# Patient Record
Sex: FEMALE | Race: WHITE | URBAN - METROPOLITAN AREA
[De-identification: names, ages, dates, MRNs, and addresses within clinical notes are randomized per-mention and may not be internally consistent; named-entity substitution may affect disease eponyms.]

---

## 2020-07-22 VITALS — WEIGHT: 135 LBS | BODY MASS INDEX: 20.46 KG/M2 | HEIGHT: 68 IN

## 2020-07-22 RX ORDER — SENNOSIDES 25 MG/1
TABLET, FILM COATED ORAL
COMMUNITY

## 2020-07-22 RX ORDER — ZOLPIDEM TARTRATE 5 MG/1
TABLET ORAL
COMMUNITY

## 2020-07-22 RX ORDER — TRIAMCINOLONE ACETONIDE 40 MG/ML
INJECTION, SUSPENSION INTRA-ARTICULAR; INTRAMUSCULAR ONCE
COMMUNITY

## 2020-07-22 RX ORDER — TRAMADOL HYDROCHLORIDE 50 MG/1
50 TABLET ORAL
COMMUNITY

## 2020-07-22 RX ORDER — DICLOFENAC SODIUM 30 MG/G
GEL TOPICAL 2 TIMES DAILY
COMMUNITY

## 2020-07-22 RX ORDER — DEXTROAMPHETAMINE SACCHARATE, AMPHETAMINE ASPARTATE, DEXTROAMPHETAMINE SULFATE AND AMPHETAMINE SULFATE 7.5; 7.5; 7.5; 7.5 MG/1; MG/1; MG/1; MG/1
30 TABLET ORAL
COMMUNITY

## 2020-07-22 RX ORDER — LIDOCAINE HYDROCHLORIDE 10 MG/ML
INJECTION, SOLUTION EPIDURAL; INFILTRATION; INTRACAUDAL; PERINEURAL ONCE
COMMUNITY

## 2020-07-22 RX ORDER — DEXTROAMPHETAMINE SACCHARATE, AMPHETAMINE ASPARTATE, DEXTROAMPHETAMINE SULFATE AND AMPHETAMINE SULFATE 3.75; 3.75; 3.75; 3.75 MG/1; MG/1; MG/1; MG/1
15 TABLET ORAL
COMMUNITY

## 2020-07-31 ENCOUNTER — OFFICE VISIT (OUTPATIENT)
Dept: ORTHOPEDIC SURGERY | Age: 60
End: 2020-07-31
Payer: MEDICAID

## 2020-07-31 DIAGNOSIS — M25.532 LEFT WRIST PAIN: Primary | ICD-10-CM

## 2020-07-31 PROCEDURE — 99213 OFFICE O/P EST LOW 20 MIN: CPT | Performed by: ORTHOPAEDIC SURGERY

## 2020-07-31 NOTE — PATIENT INSTRUCTIONS
Arm Pain: Care Instructions  Your Care Instructions     You can hurt your arm by using it too much or by injuring it. Biking, wrestling, and home repair projects are examples of activities that can lead to arm pain. Everyday wear and tear, especially as you get older, can cause arm pain. Your forearms, wrists, hands, and fingers are the parts of your arm that are most likely to become painful. A minor arm injury usually will heal on its own with home treatment to relieve swelling and pain. If you have a more serious injury, you may need tests and treatment. Follow-up care is a key part of your treatment and safety. Be sure to make and go to all appointments, and call your doctor if you are having problems. It's also a good idea to know your test results and keep a list of the medicines you take. How can you care for yourself at home? · Take pain medicines exactly as directed. ? If the doctor gave you a prescription medicine for pain, take it as prescribed. ? If you are not taking a prescription pain medicine, ask your doctor if you can take an over-the-counter medicine. · Rest and protect your arm. Take a break from any activity that may cause pain. · Put ice or a cold pack on your arm for 10 to 20 minutes at a time. Put a thin cloth between the ice and your skin. · Prop up the sore arm on a pillow when you ice it or anytime you sit or lie down during the next 3 days. Try to keep it above the level of your heart. This will help reduce swelling. · If your doctor recommends a sling to support your arm, wear it as directed. When should you call for help? [id number] anytime you think you may need emergency care. For example, call if:  · Your arm or hand is cool or pale or changes color. Call your doctor now or seek immediate medical care if:  · You cannot use your arm. · You have signs of infection, such as:  ? Increased pain, swelling, warmth, or redness.   ? Red streaks running up or down your arm.  ? Pus draining from an area of your arm. ? A fever. · You have tingling, weakness, or numbness in your arm. Watch closely for changes in your health, and be sure to contact your doctor if:  · You do not get better as expected. Where can you learn more? Go to http://krystina-meghann.info/  Enter B641 in the search box to learn more about \"Arm Pain: Care Instructions. \"  Current as of: June 26, 2019               Content Version: 12.5  © 3378-7287 [employer]. Care instructions adapted under license by [employer] (which disclaims liability or warranty for this information). If you have questions about a medical condition or this instruction, always ask your healthcare professional. [address] any warranty or liability for your use of this information.

## 2020-07-31 NOTE — PROGRESS NOTES
Providers protocol for the intake nurse to complete in patient's chart:    Nancy Stephens presents today for   Chief Complaint   Patient presents with    Wrist Pain     left       [facility]  [facility] by [facility]    Provider will complete the rest of patient's chart

## 2020-07-31 NOTE — PROGRESS NOTES
Name: Darryle Officer    : 1960  Service Dept: [address] [place] [address] MEDICINE         Chief Complaint   Patient presents with    Wrist Pain     left        Patient's Pharmacies:    [id number] - [address]  [address] [place] [address]  Phone: [phone] Fax: [phone]       There were no vitals taken for this visit. Allergies   Allergen Reactions    Doxycycline Swelling        Current Outpatient Medications   Medication Sig Dispense Refill    dextroamphetamine-amphetamine (ADDERALL) 15 mg tablet Take 15 mg by mouth.  dextroamphetamine-amphetamine (ADDERALL) 30 mg tablet Take 30 mg by mouth.  diclofenac (SOLARAZE) 3 % topical gel Apply  to affected area two (2) times a day.  influenza vaccine -, 6 mos+,,PF, (Fluarix Quad [id number], PF,) syrg injection 0.5 mL by IntraMUSCular route PRIOR TO DISCHARGE.  triamcinolone acetonide (Kenalog) 40 mg/mL injection once.  lidocaine, PF, (XYLOCAINE) 10 mg/mL (1 %) injection by IntraVENous route once.  lidocaine 5 % topical cream Apply  to affected area two (2) times daily as needed.  traMADoL (ULTRAM) 50 mg tablet Take 50 mg by mouth every six (6) hours as needed for Pain.  zolpidem (AMBIEN) 5 mg tablet Take  by mouth nightly as needed for Sleep. There is no problem list on file for this patient.        Family History   Problem Relation Age of Onset    Cancer Mother     Arthritis-rheumatoid Mother     Cancer Brother     Alcohol abuse Brother         Social History     Socioeconomic History    Marital status:      Spouse name: Not on file    Number of children: Not on file    Years of education: Not on file    Highest education level: Not on file   Tobacco Use    Smoking status: Never Smoker    Smokeless tobacco: Never Used   Substance and Sexual Activity    Alcohol use: Yes     Comment: wine once a month        Past Surgical History:   Procedure Laterality Date    HX  SECTION      HX HEMORRHOIDECTOMY          Past Medical History:   Diagnosis Date    Heart disease         HPI:   I have reviewed and agree with PFSH and ROS and intake form in chart and the record. Review of Systems:   Patient is a pleasant appearing individual, appropriately dressed, well hydrated, well nourished, who is alert, appropriately oriented for age, and in no acute distress with a normal gait and normal affect who does not appear to be in any significant pain. Physical Exam:  Left wrist - grossly neurovascularly intact, good cap refill, positive tenderness to palpation, positive soft tissue swelling, decreased range of motion, decreased strength, skin intact. Right wrist- Grossly neurovascularly intact, good cap refill, full range of motion, no weakness, no swelling, no point tenderness, no skin lesions. HPI:  The patient is here with a chief complaint of left wrist pain, dull pain. It is the same. Ice has helped. Using it makes it worse. Pain is 2/10. ROS:  Unremarkable. X-rays of the left wrist are unremarkable. She continues to have hemorrhagic blistering at the left wrist.     Assessment/Plan:  Plan at this point, my recommendation would be for an MRI to assess that and go from there. if the patient gets worse, they will give me a call. No restrictions in the meantime. Again, left wrist x-rays are unremarkable. Return to Office:    Follow-up Information    None

## 2020-08-06 DIAGNOSIS — M25.532 LEFT WRIST PAIN: ICD-10-CM

## 2020-08-26 ENCOUNTER — VIRTUAL VISIT (OUTPATIENT)
Dept: ORTHOPEDIC SURGERY | Age: 60
End: 2020-08-26
Payer: MEDICAID

## 2020-08-26 VITALS — BODY MASS INDEX: 20.46 KG/M2 | WEIGHT: 135 LBS | HEIGHT: 68 IN

## 2020-08-26 DIAGNOSIS — M17.11 PRIMARY OSTEOARTHRITIS OF RIGHT KNEE: Primary | ICD-10-CM

## 2020-08-26 PROBLEM — M19.90 ARTHRITIS: Status: ACTIVE | Noted: 2020-08-26

## 2020-08-26 PROBLEM — R01.1 HEART MURMUR: Status: ACTIVE | Noted: 2020-08-26

## 2020-08-26 PROBLEM — F98.8 ADD (ATTENTION DEFICIT DISORDER): Status: ACTIVE | Noted: 2020-08-26

## 2020-08-26 PROCEDURE — 99441 PR PHYS/QHP TELEPHONE EVALUATION 5-10 MIN: CPT | Performed by: ORTHOPAEDIC SURGERY

## 2020-08-26 RX ORDER — QUINIDINE GLUCONATE 324 MG
240 TABLET, EXTENDED RELEASE ORAL
COMMUNITY

## 2020-08-26 RX ORDER — LORATADINE 10 MG/1
10 TABLET ORAL
COMMUNITY

## 2020-08-26 RX ORDER — GLUCOSAMINE SULFATE 1500 MG
POWDER IN PACKET (EA) ORAL DAILY
COMMUNITY

## 2020-08-26 NOTE — PATIENT INSTRUCTIONS
Knee Pain or Injury: Care Instructions Your Care Instructions Injuries are a common cause of knee problems. Sudden (acute) injuries may be caused by a direct blow to the knee. They can also be caused by abnormal twisting, bending, or falling on the knee. Pain, bruising, or swelling may be severe, and may start within minutes of the injury. Overuse is another cause of knee pain. Other causes are climbing stairs, kneeling, and other activities that use the knee. Everyday wear and tear, especially as you get older, also can cause knee pain. Rest, along with home treatment, often relieves pain and allows your knee to heal. If you have a serious knee injury, you may need tests and treatment. Follow-up care is a key part of your treatment and safety. Be sure to make and go to all appointments, and call your doctor if you are having problems. It's also a good idea to know your test results and keep a list of the medicines you take. How can you care for yourself at home? · Be safe with medicines. Read and follow all instructions on the label. ? If the doctor gave you a prescription medicine for pain, take it as prescribed. ? If you are not taking a prescription pain medicine, ask your doctor if you can take an over-the-counter medicine. · Rest and protect your knee. Take a break from any activity that may cause pain. · Put ice or a cold pack on your knee for 10 to 20 minutes at a time. Put a thin cloth between the ice and your skin. · Prop up a sore knee on a pillow when you ice it or anytime you sit or lie down for the next 3 days. Try to keep it above the level of your heart. This will help reduce swelling. · If your knee is not swollen, you can put moist heat, a heating pad, or a warm cloth on your knee. · If your doctor recommends an elastic bandage, sleeve, or other type of support for your knee, wear it as directed.  
· Follow your doctor's instructions about how much weight you can put on your leg. Use a cane, crutches, or a walker as instructed. · Follow your doctor's instructions about activity during your healing process. If you can do mild exercise, slowly increase your activity. · Reach and stay at a healthy weight. Extra weight can strain the joints, especially the knees and hips, and make the pain worse. Losing even a few pounds may help. When should you call for help? [id number] anytime you think you may need emergency care. For example, call if: 
· You have symptoms of a blood clot in your lung (called a pulmonary embolism). These may include: 
? Sudden chest pain. ? Trouble breathing. ? Coughing up blood. Call your doctor now or seek immediate medical care if: 
· You have severe or increasing pain. · Your leg or foot turns cold or changes color. · You cannot stand or put weight on your knee. · Your knee looks twisted or bent out of shape. · You cannot move your knee. · You have signs of infection, such as: 
? Increased pain, swelling, warmth, or redness. ? Red streaks leading from the knee. ? Pus draining from a place on your knee. ? A fever. · You have signs of a blood clot in your leg (called a deep vein thrombosis), such as: 
? Pain in your calf, back of the knee, thigh, or groin. ? Redness and swelling in your leg or groin. Watch closely for changes in your health, and be sure to contact your doctor if: 
· You have tingling, weakness, or numbness in your knee. · You have any new symptoms, such as swelling. · You have bruises from a knee injury that last longer than 2 weeks. · You do not get better as expected. Where can you learn more? Go to http://www.gray.com/ Enter K195 in the search box to learn more about \"Knee Pain or Injury: Care Instructions. \" Current as of: June 26, 2019               Content Version: 12.5 © 4400-2848 [employer].   
Care instructions adapted under license by [employer] (which disclaims liability or warranty for this information). If you have questions about a medical condition or this instruction, always ask your healthcare professional. [address] any warranty or liability for your use of this information.

## 2020-08-26 NOTE — PROGRESS NOTES
Kira Thomas, who was evaluated through a synchronous (real-time) audio only encounter, and/or her healthcare decision maker, is aware that it is a billable service, with coverage as determined by her insurance carrier. She provided verbal consent to proceed: Yes, and patient identification was verified. It was conducted pursuant to the emergency declaration under the [address] [place] [address] and the [employer]. A caregiver was present when appropriate. Ability to conduct physical exam was limited. I was at home. The patient was at home. Spoke with patient on phone and abstracted as well as addressed screening questions with patient.

## 2022-03-18 PROBLEM — F98.8 ADD (ATTENTION DEFICIT DISORDER): Status: ACTIVE | Noted: 2020-08-26

## 2022-03-19 PROBLEM — M19.90 ARTHRITIS: Status: ACTIVE | Noted: 2020-08-26

## 2022-03-19 PROBLEM — R01.1 HEART MURMUR: Status: ACTIVE | Noted: 2020-08-26

## 2024-08-23 ENCOUNTER — OFFICE VISIT (OUTPATIENT)
Age: 64
End: 2024-08-23
Payer: MEDICAID

## 2024-08-23 VITALS — BODY MASS INDEX: 21.22 KG/M2 | WEIGHT: 140 LBS | HEIGHT: 68 IN

## 2024-08-23 DIAGNOSIS — M25.511 ACUTE PAIN OF RIGHT SHOULDER: ICD-10-CM

## 2024-08-23 DIAGNOSIS — M75.51 BURSITIS OF RIGHT SHOULDER: Primary | ICD-10-CM

## 2024-08-23 PROCEDURE — 20611 DRAIN/INJ JOINT/BURSA W/US: CPT | Performed by: ORTHOPAEDIC SURGERY

## 2024-08-23 PROCEDURE — 99203 OFFICE O/P NEW LOW 30 MIN: CPT | Performed by: ORTHOPAEDIC SURGERY

## 2024-08-23 RX ORDER — TRIAMCINOLONE ACETONIDE 40 MG/ML
40 INJECTION, SUSPENSION INTRA-ARTICULAR; INTRAMUSCULAR ONCE
Status: COMPLETED | OUTPATIENT
Start: 2024-08-23 | End: 2024-08-23

## 2024-08-23 RX ORDER — LIDOCAINE HYDROCHLORIDE 10 MG/ML
9 INJECTION, SOLUTION INFILTRATION; PERINEURAL ONCE
Status: COMPLETED | OUTPATIENT
Start: 2024-08-23 | End: 2024-08-23

## 2024-08-23 RX ADMIN — TRIAMCINOLONE ACETONIDE 40 MG: 40 INJECTION, SUSPENSION INTRA-ARTICULAR; INTRAMUSCULAR at 10:40

## 2024-08-23 RX ADMIN — LIDOCAINE HYDROCHLORIDE 9 ML: 10 INJECTION, SOLUTION INFILTRATION; PERINEURAL at 10:40

## 2024-08-23 NOTE — PROGRESS NOTES
Date    ADD (attention deficit disorder) 8/26/2020    Arthritis 8/26/2020    Heart disease     Heart murmur 8/26/2020    Osteoarthritis         I have reviewed and agree with PFSH and ROS and intake form in chart and the record furthermore I have reviewed prior medical record(s) regarding this patients care during this appointment.     Review of Systems:   Patient is a pleasant appearing individual, appropriately dressed, well hydrated, well nourished, who is alert, appropriately oriented for age, and in no acute distress with a normal gait and normal affect who does not appear to be in any significant pain.    Physical Exam:  Right Shoulder - Grossly neurovascularly intact. Range of motion-Full passive, Active with impingement. No Point tenderness, Strength-weakness with abduction, some mild crepitation, No skin lesion are identified, No instabilty is noted, No apprehension. No Swelling.     Left Shoulder - Grossly neurovascularly intact, Full Range of motion, No point tenderness, No weakness, No skin lesions, No Instability, No apprehension, No swelling.     Procedure Documentation:    I discussed in detail the risks, benefits and complications of an injection which included but are not limited to infection, skin reactions, hot swollen joint, and anaphylaxis with the patient. The patient verbalized understanding and gave informed consent for the injection. The patient's right shoulder were prepped using sterile alcohol solution. A sterile needle was inserted into the right shoulder and the mixture of 9 mL Lidocaine 1%, 1 mL Kenalog 40 mg was injected under sterile technique. The needle was withdrawn and the puncture site sealed with a Band-Aid.      Technique: Under sterile conditions a [employer] ultrasound unit with a variable frequency (7.0-14.0 MHz) linear transducer was used to localize the placement of needle into the right joint.    Findings: Successful needle placement for shoulder injection.  Final images were

## 2025-04-17 ENCOUNTER — OFFICE VISIT (OUTPATIENT)
Age: 65
End: 2025-04-17

## 2025-04-17 DIAGNOSIS — M75.101 TEAR OF RIGHT ROTATOR CUFF, UNSPECIFIED TEAR EXTENT, UNSPECIFIED WHETHER TRAUMATIC: ICD-10-CM

## 2025-04-17 DIAGNOSIS — M25.511 RIGHT SHOULDER PAIN, UNSPECIFIED CHRONICITY: Primary | ICD-10-CM

## 2025-04-17 NOTE — PATIENT INSTRUCTIONS
Rotator Cuff Injury: Care Instructions  Overview     The rotator cuff is a group of tendons and muscles around the shoulder that keeps the upper arm bone in place. It keeps the shoulder joint stable and allows you to raise and rotate your arm.  Damage to the rotator cuff can be caused by overuse, a fall, or a direct blow to the shoulder area, which can tear the tendons. Over time, everyday wear can damage the tendons and make injury more likely.  Treatment can depend on the type and amount of damage to the tendons. Your doctor may have you try physical therapy and exercise first. If physical therapy does not help, your doctor may recommend surgery.  Follow-up care is a key part of your treatment and safety. Be sure to make and go to all appointments, and call your doctor if you are having problems. It's also a good idea to know your test results and keep a list of the medicines you take.  How can you care for yourself at home?  Rest your shoulder as much as you can. If your doctor put your arm in a sling or shoulder immobilizer, wear it as directed. Do not take it off before your doctor tells you to. If it is too tight, loosen it.  Be safe with medicines. Read and follow all instructions on the label.  If the doctor gave you a prescription medicine for pain, take it as prescribed.  If you are not taking a prescription pain medicine, ask your doctor if you can take an over-the-counter medicine.  Put ice or a cold pack on your shoulder for 10 to 20 minutes at a time. Try to do this every 1 to 2 hours for the next 3 days (when you are awake). Put a thin cloth between the ice pack and your skin.  After 2 or 3 days, if you don't have swelling, apply heat. Put a warm water bottle, a heating pad set on low, or a warm cloth on your shoulder. Do not go to sleep with a heating pad on your skin. Put a thin cloth between the heating pad and your skin.  While holding a warm, wet towel on your shoulder, lean forward so your arm

## 2025-04-17 NOTE — PROGRESS NOTES
Name: Nhung Velázquez    : 1960     [facility]ICINE  [address] [facility] [address]  [facility] [address]  Dept: [phone]  Dept Fax: [phone]   Chief Complaint   Patient presents with    Shoulder Pain     There were no vitals taken for this visit.   Allergies   Allergen Reactions    Doxycycline Swelling     Current Outpatient Medications   Medication Sig Dispense Refill    vitamin D 25 MCG (1000 UT) CAPS Take by mouth daily      Diclofenac Sodium 3 % GEL Apply topically 2 times daily      ferrous gluconate (FERGON) 240 (27 Fe) MG tablet Take 240 mg by mouth 3 times daily (with meals)      lidocaine 1 % injection Infuse intravenously once      loratadine (CLARITIN) 10 MG tablet Take 10 mg by mouth      triamcinolone (TRIESCENCE) 40 MG/ML SUSP once       No current facility-administered medications for this visit.       Patient Active Problem List   Diagnosis    ADD (attention deficit disorder)    Heart murmur    Arthritis      Family History   Problem Relation Age of Onset    Cancer Mother     Alcohol Abuse Brother     Cancer Brother     Rheum Arthritis Mother        Past Surgical History:   Procedure Laterality Date    ANTERIOR CRUCIATE LIGAMENT REPAIR      meniscus repair    BREAST BIOPSY       SECTION      HEMORRHOID SURGERY      KNEE SURGERY        Past Medical History:   Diagnosis Date    ADD (attention deficit disorder) 2020    Arthritis 2020    Heart disease     Heart murmur 2020    Osteoarthritis         I have reviewed and agree with PFSH and ROS and intake form in chart and the record furthermore I have reviewed prior medical record(s) regarding this patients care during this appointment.   Review of Systems:   Patient is a pleasant appearing individual, appropriately dressed, well hydrated, well nourished, who is alert, appropriately oriented for age, and in no acute distress with a

## 2025-05-07 ENCOUNTER — HOSPITAL ENCOUNTER (OUTPATIENT)
Age: 65
Discharge: HOME | End: 2025-05-10
Attending: ORTHOPAEDIC SURGERY
Payer: MEDICAID

## 2025-05-07 DIAGNOSIS — M25.511 RIGHT SHOULDER PAIN, UNSPECIFIED CHRONICITY: ICD-10-CM

## 2025-05-07 DIAGNOSIS — M75.101 TEAR OF RIGHT ROTATOR CUFF, UNSPECIFIED TEAR EXTENT, UNSPECIFIED WHETHER TRAUMATIC: ICD-10-CM

## 2025-05-07 PROCEDURE — 73221 MRI JOINT UPR EXTREM W/O DYE: CPT

## 2025-05-22 ENCOUNTER — OFFICE VISIT (OUTPATIENT)
Age: 65
End: 2025-05-22
Payer: MEDICAID

## 2025-05-22 DIAGNOSIS — M75.51 BURSITIS OF RIGHT SHOULDER: Primary | ICD-10-CM

## 2025-05-22 PROCEDURE — 99212 OFFICE O/P EST SF 10 MIN: CPT | Performed by: ORTHOPAEDIC SURGERY

## 2025-05-22 NOTE — PROGRESS NOTES
Name: Nhung Velázquez    : 1960     [facility] MEDICINE  [address] [place]  [facility] [address]  Dept: [phone]  Dept Fax: [phone]   Chief Complaint   Patient presents with    Shoulder Pain     There were no vitals taken for this visit.   Allergies   Allergen Reactions    Doxycycline Swelling     Current Outpatient Medications   Medication Sig Dispense Refill    vitamin D 25 MCG (1000 UT) CAPS Take by mouth daily      Diclofenac Sodium 3 % GEL Apply topically 2 times daily      ferrous gluconate (FERGON) 240 (27 Fe) MG tablet Take 240 mg by mouth 3 times daily (with meals)      lidocaine 1 % injection Infuse intravenously once      loratadine (CLARITIN) 10 MG tablet Take 10 mg by mouth      triamcinolone (TRIESCENCE) 40 MG/ML SUSP once       No current facility-administered medications for this visit.       Patient Active Problem List   Diagnosis    ADD (attention deficit disorder)    Heart murmur    Arthritis      Family History   Problem Relation Age of Onset    Cancer Mother     Alcohol Abuse Brother     Cancer Brother     Rheum Arthritis Mother        Past Surgical History:   Procedure Laterality Date    ANTERIOR CRUCIATE LIGAMENT REPAIR      meniscus repair    BREAST BIOPSY       SECTION      HEMORRHOID SURGERY      KNEE SURGERY        Past Medical History:   Diagnosis Date    ADD (attention deficit disorder) 2020    Arthritis 2020    Heart disease     Heart murmur 2020    Osteoarthritis         I have reviewed and agree with PFSH and ROS and intake form in chart and the record furthermore I have reviewed prior medical record(s) regarding this patients care during this appointment.   Review of Systems:   Patient is a pleasant appearing individual, appropriately dressed, well hydrated, well nourished, who is alert, appropriately oriented for age, and in no acute distress with a